# Patient Record
Sex: MALE | Race: WHITE | NOT HISPANIC OR LATINO | Employment: FULL TIME | URBAN - METROPOLITAN AREA
[De-identification: names, ages, dates, MRNs, and addresses within clinical notes are randomized per-mention and may not be internally consistent; named-entity substitution may affect disease eponyms.]

---

## 2024-11-28 ENCOUNTER — APPOINTMENT (OUTPATIENT)
Dept: CARDIOLOGY | Facility: HOSPITAL | Age: 59
End: 2024-11-28
Payer: COMMERCIAL

## 2024-11-28 ENCOUNTER — HOSPITAL ENCOUNTER (EMERGENCY)
Facility: HOSPITAL | Age: 59
Discharge: HOME | End: 2024-11-28
Attending: EMERGENCY MEDICINE
Payer: COMMERCIAL

## 2024-11-28 ENCOUNTER — APPOINTMENT (OUTPATIENT)
Dept: RADIOLOGY | Facility: HOSPITAL | Age: 59
End: 2024-11-28
Payer: COMMERCIAL

## 2024-11-28 VITALS
HEART RATE: 58 BPM | RESPIRATION RATE: 16 BRPM | DIASTOLIC BLOOD PRESSURE: 78 MMHG | HEIGHT: 73 IN | TEMPERATURE: 98.1 F | WEIGHT: 245 LBS | SYSTOLIC BLOOD PRESSURE: 153 MMHG | OXYGEN SATURATION: 98 % | BODY MASS INDEX: 32.47 KG/M2

## 2024-11-28 DIAGNOSIS — K52.9 COLITIS: Primary | ICD-10-CM

## 2024-11-28 LAB
ALBUMIN SERPL BCP-MCNC: 4.3 G/DL (ref 3.4–5)
ALP SERPL-CCNC: 44 U/L (ref 33–120)
ALT SERPL W P-5'-P-CCNC: 18 U/L (ref 10–52)
ANION GAP SERPL CALC-SCNC: 17 MMOL/L (ref 10–20)
APPEARANCE UR: CLEAR
AST SERPL W P-5'-P-CCNC: 18 U/L (ref 9–39)
BASOPHILS # BLD AUTO: 0.03 X10*3/UL (ref 0–0.1)
BASOPHILS NFR BLD AUTO: 0.4 %
BILIRUB SERPL-MCNC: 0.4 MG/DL (ref 0–1.2)
BILIRUB UR STRIP.AUTO-MCNC: NEGATIVE MG/DL
BUN SERPL-MCNC: 15 MG/DL (ref 6–23)
CALCIUM SERPL-MCNC: 9 MG/DL (ref 8.6–10.3)
CARDIAC TROPONIN I PNL SERPL HS: 3 NG/L (ref 0–20)
CARDIAC TROPONIN I PNL SERPL HS: 3 NG/L (ref 0–20)
CHLORIDE SERPL-SCNC: 101 MMOL/L (ref 98–107)
CO2 SERPL-SCNC: 22 MMOL/L (ref 21–32)
COLOR UR: NORMAL
CREAT SERPL-MCNC: 1.15 MG/DL (ref 0.5–1.3)
EGFRCR SERPLBLD CKD-EPI 2021: 74 ML/MIN/1.73M*2
EOSINOPHIL # BLD AUTO: 0.12 X10*3/UL (ref 0–0.7)
EOSINOPHIL NFR BLD AUTO: 1.7 %
ERYTHROCYTE [DISTWIDTH] IN BLOOD BY AUTOMATED COUNT: 12.6 % (ref 11.5–14.5)
GLUCOSE SERPL-MCNC: 122 MG/DL (ref 74–99)
GLUCOSE UR STRIP.AUTO-MCNC: NORMAL MG/DL
HCT VFR BLD AUTO: 42.2 % (ref 41–52)
HGB BLD-MCNC: 13.5 G/DL (ref 13.5–17.5)
HOLD SPECIMEN: NORMAL
HOLD SPECIMEN: NORMAL
IMM GRANULOCYTES # BLD AUTO: 0.02 X10*3/UL (ref 0–0.7)
IMM GRANULOCYTES NFR BLD AUTO: 0.3 % (ref 0–0.9)
KETONES UR STRIP.AUTO-MCNC: NEGATIVE MG/DL
LACTATE SERPL-SCNC: 1.3 MMOL/L (ref 0.4–2)
LACTATE SERPL-SCNC: 2.6 MMOL/L (ref 0.4–2)
LEUKOCYTE ESTERASE UR QL STRIP.AUTO: NEGATIVE
LIPASE SERPL-CCNC: 29 U/L (ref 9–82)
LYMPHOCYTES # BLD AUTO: 2.47 X10*3/UL (ref 1.2–4.8)
LYMPHOCYTES NFR BLD AUTO: 34 %
MCH RBC QN AUTO: 28 PG (ref 26–34)
MCHC RBC AUTO-ENTMCNC: 32 G/DL (ref 32–36)
MCV RBC AUTO: 87 FL (ref 80–100)
MONOCYTES # BLD AUTO: 0.58 X10*3/UL (ref 0.1–1)
MONOCYTES NFR BLD AUTO: 8 %
NEUTROPHILS # BLD AUTO: 4.04 X10*3/UL (ref 1.2–7.7)
NEUTROPHILS NFR BLD AUTO: 55.6 %
NITRITE UR QL STRIP.AUTO: NEGATIVE
NRBC BLD-RTO: 0 /100 WBCS (ref 0–0)
PH UR STRIP.AUTO: 5.5 [PH]
PLATELET # BLD AUTO: 235 X10*3/UL (ref 150–450)
POTASSIUM SERPL-SCNC: 3.5 MMOL/L (ref 3.5–5.3)
PROT SERPL-MCNC: 7.4 G/DL (ref 6.4–8.2)
PROT UR STRIP.AUTO-MCNC: NEGATIVE MG/DL
RBC # BLD AUTO: 4.83 X10*6/UL (ref 4.5–5.9)
RBC # UR STRIP.AUTO: NEGATIVE /UL
SODIUM SERPL-SCNC: 136 MMOL/L (ref 136–145)
SP GR UR STRIP.AUTO: 1.02
UROBILINOGEN UR STRIP.AUTO-MCNC: NORMAL MG/DL
WBC # BLD AUTO: 7.3 X10*3/UL (ref 4.4–11.3)

## 2024-11-28 PROCEDURE — 99285 EMERGENCY DEPT VISIT HI MDM: CPT | Performed by: EMERGENCY MEDICINE

## 2024-11-28 PROCEDURE — 83605 ASSAY OF LACTIC ACID: CPT | Performed by: EMERGENCY MEDICINE

## 2024-11-28 PROCEDURE — 93005 ELECTROCARDIOGRAM TRACING: CPT

## 2024-11-28 PROCEDURE — 84484 ASSAY OF TROPONIN QUANT: CPT | Performed by: EMERGENCY MEDICINE

## 2024-11-28 PROCEDURE — 36415 COLL VENOUS BLD VENIPUNCTURE: CPT | Performed by: EMERGENCY MEDICINE

## 2024-11-28 PROCEDURE — 81003 URINALYSIS AUTO W/O SCOPE: CPT | Performed by: EMERGENCY MEDICINE

## 2024-11-28 PROCEDURE — 85025 COMPLETE CBC W/AUTO DIFF WBC: CPT | Performed by: EMERGENCY MEDICINE

## 2024-11-28 PROCEDURE — 93010 ELECTROCARDIOGRAM REPORT: CPT | Performed by: EMERGENCY MEDICINE

## 2024-11-28 PROCEDURE — 2550000001 HC RX 255 CONTRASTS

## 2024-11-28 PROCEDURE — 74177 CT ABD & PELVIS W/CONTRAST: CPT | Performed by: STUDENT IN AN ORGANIZED HEALTH CARE EDUCATION/TRAINING PROGRAM

## 2024-11-28 PROCEDURE — 83690 ASSAY OF LIPASE: CPT | Performed by: EMERGENCY MEDICINE

## 2024-11-28 PROCEDURE — 99285 EMERGENCY DEPT VISIT HI MDM: CPT | Mod: 25

## 2024-11-28 PROCEDURE — 84484 ASSAY OF TROPONIN QUANT: CPT

## 2024-11-28 PROCEDURE — 74177 CT ABD & PELVIS W/CONTRAST: CPT

## 2024-11-28 PROCEDURE — 80053 COMPREHEN METABOLIC PANEL: CPT | Performed by: EMERGENCY MEDICINE

## 2024-11-28 PROCEDURE — 96360 HYDRATION IV INFUSION INIT: CPT

## 2024-11-28 PROCEDURE — 2500000004 HC RX 250 GENERAL PHARMACY W/ HCPCS (ALT 636 FOR OP/ED)

## 2024-11-28 RX ORDER — CIPROFLOXACIN 500 MG/1
500 TABLET ORAL EVERY 12 HOURS
Qty: 10 TABLET | Refills: 0 | Status: SHIPPED | OUTPATIENT
Start: 2024-11-28 | End: 2024-12-03

## 2024-11-28 RX ORDER — METRONIDAZOLE 500 MG/1
500 TABLET ORAL 3 TIMES DAILY
Qty: 30 TABLET | Refills: 0 | Status: SHIPPED | OUTPATIENT
Start: 2024-11-28 | End: 2024-12-05

## 2024-11-28 RX ORDER — ONDANSETRON 4 MG/1
4 TABLET, ORALLY DISINTEGRATING ORAL EVERY 8 HOURS PRN
Qty: 20 TABLET | Refills: 0 | Status: SHIPPED | OUTPATIENT
Start: 2024-11-28 | End: 2024-12-05

## 2024-11-28 ASSESSMENT — COLUMBIA-SUICIDE SEVERITY RATING SCALE - C-SSRS
2. HAVE YOU ACTUALLY HAD ANY THOUGHTS OF KILLING YOURSELF?: NO
1. IN THE PAST MONTH, HAVE YOU WISHED YOU WERE DEAD OR WISHED YOU COULD GO TO SLEEP AND NOT WAKE UP?: NO
6. HAVE YOU EVER DONE ANYTHING, STARTED TO DO ANYTHING, OR PREPARED TO DO ANYTHING TO END YOUR LIFE?: NO

## 2024-11-28 ASSESSMENT — PAIN - FUNCTIONAL ASSESSMENT: PAIN_FUNCTIONAL_ASSESSMENT: 0-10

## 2024-11-28 ASSESSMENT — PAIN SCALES - GENERAL
PAINLEVEL_OUTOF10: 0 - NO PAIN
PAINLEVEL_OUTOF10: 4

## 2024-11-28 ASSESSMENT — PAIN DESCRIPTION - LOCATION: LOCATION: ABDOMEN

## 2024-11-28 ASSESSMENT — PAIN DESCRIPTION - ORIENTATION: ORIENTATION: MID;LOWER

## 2024-11-29 LAB — HOLD SPECIMEN: NORMAL

## 2024-11-29 NOTE — DISCHARGE INSTRUCTIONS
You were seen in the Emergency Department (ED) for abdominal pain. Your work-up and exam was unimpressive for life or limb-threatening cause at this time.    Please utilize anti-inflammatories acetaminophen (Tylenol) and ibuprofen (Advil) for your pain as recommended. You can take both acetaminophen and ibuprofen together.  Utilize a clear liquid diet for at least 1 week.    Please seek immediate medical attention should you feel worse in any way or have any of the following symptoms: increasing or different abdominal pain, abdominal distension (bloating), persistent vomiting, blood in stool, fevers of 38C (100.4) or higher, shaking chills, lightheadedness, confusion, yellowish skin, or itchiness. Please return to the emergency department for a recheck in 8-12 hours if the pain is persistent or worse so we can re-evaluate you and ensure that you are not developing a problem that would require surgery or hospitalization.   Statement Selected

## 2024-11-29 NOTE — ED PROVIDER NOTES
Emergency Department Provider Note        History of Present Illness     History provided by: Patient  Limitations to History: None  External Records Reviewed with Brief Summary: None    HPI:  Miky Kaiser is a 59 y.o. male presents with a chief complaint of severe abdominal pain, heavy sweating, and diarrhea that began suddenly during a meal. The pain was located in the lower abdomen and was accompanied by an urgent need to use the restroom. The patient reports no blood in the stool, which was described as mushy. The symptoms started right after eating, and the patient has no prior history of similar pain.    The patient has a PMH of a hernia on the left side, bowel issues as a child (possibly intussusception), and a recent colonoscopy in January revealing one polyp and an ulcer. The patient was taking naproxen for lower back pain but switched to Tylenol on the doctor's advice.    The patient denies being diabetic, although it runs in the family. The patient's current medications include vitamins D, C, zinc, apple cider vinegar, and fish oil for cholesterol, but no prescribed medications. The patient does not smoke cigarettes, drinks alcohol occasionally, and uses marijuana gummies infrequently. The patient denies using other drugs such as cocaine or heroin.    Physical Exam   Triage vitals:  T 36.7 °C (98.1 °F)  HR 64  /62  RR 18  O2 100 % None (Room air)    General: Awake, alert, in no acute distress  Eyes: Gaze conjugate.  No scleral icterus or injection  HENT: Normo-cephalic, atraumatic. No stridor  CV: Regular rate, regular rhythm. Radial pulses 2+ bilaterally  Resp: Breathing non-labored, speaking in full sentences.  Clear to auscultation bilaterally  GI: Soft, non-distended. No rebound or guarding. Left inguinal hernia reducible and non-tender. Lower abdominal tenderness.  MSK/Extremities: No gross bony deformities. Moving all extremities  Skin: Warm. Appropriate color  Neuro: Alert. Oriented. Face  symmetric. Speech is fluent.  Gross strength and sensation intact in b/l UE and LEs  Psych: Appropriate mood and affect    Medical Decision Making & ED Course   Medical Decision Makin y.o. male with a history of gastrointestinal issues, including possible intussusception and difficulty with bowel movements since childhood, presenting with acute abdominal pain, heavy sweating, and diarrhea after eating. The patient has a history of hernia on the left side and a recent colonoscopy showing one polyp and an ulcer. The patient also reports lightheadedness and has a family history of diabetes.    Based on the patient's history, symptoms, and presentation, the condition is most concerning for an acute gastrointestinal issue, possibly related to the patient's history of bowel problems. However, complications related to the hernia or other abdominal abnormalities cannot be ruled out without further investigation.    Plan includes obtaining a CT scan of the abdomen and pelvis, ordering labs including CBC, CMP, UA, lipase, and troponin, and monitoring pain levels. The patient's vital signs will be monitored due to reported lightheadedness.   ----  Differential diagnoses considered include but are not limited to: Appendicitis, Cholecystitis, Pancreatitis, PUD, Gastroenteritis, Intestinal obstruction, Diverticulitis, IBD, ACS, UTI/Pyelo, Calculi, Hepatitis, Splenic infarct, Hernia, Constipation, Adhesions, Malignancy, Bowel perforation, Volvulus, Peritonitis, Trauma, Abscess     Social Determinants of Health which Significantly Impact Care: None identified     EKG Independent Interpretation: EKG at 2119. Ventricular Rate: 52. Rhythm: Sinus. LAD. No acute ischemic changes.  ms. QTc: not prolonged at 403 ms.     Independent Result Review and Interpretation: Labs unimpressive for systemic inflammation or infection, acute anemia or blood loss, JAMES, hepatitis, pancreatitis, lactic acidosis, acute coronary syndrome, or  electrolyte abnormalities.  CT abdomen pelvis reveals diarrhea throughout the transverse colon related thickening of the colonic wall and rectum concerning for colitis.  No terminal endpoint dilation to suggest obstruction, free air, or free fluid in the abdomen.    Chronic conditions affecting the patient's care: As documented above in [facility]    The patient was discussed with the following consultants/services: None    Care Considerations: As documented above in [facility]    ED Course:  Diagnoses as of 11/29/24 1007   Colitis     Disposition   As a result of the work-up, the patient was discharged home.  he was informed of his diagnosis and instructed to come back with any concerns or worsening of condition. Ciprofloxacin and Flagyl prescribed for colitis with zofran as needed for nausea. Instructed to utilize cler liquid diet for 1 week.  he and was agreeable to the plan as discussed above.  he was given the opportunity to ask questions.  All of the patient's questions were answered.    Procedures   Procedures    This was a shared visit with an ED attending.  The patient was seen and discussed with the ED attending    Charlie Cheung MD  Emergency Medicine, PGY3     Charlie Cheung MD  Resident  11/29/24 1007

## 2024-11-30 LAB
ATRIAL RATE: 56 BPM
ATRIAL RATE: 64 BPM
P AXIS: 48 DEGREES
P AXIS: 64 DEGREES
P OFFSET: 172 MS
P OFFSET: 180 MS
P ONSET: 114 MS
P ONSET: 120 MS
PR INTERVAL: 176 MS
PR INTERVAL: 184 MS
Q ONSET: 206 MS
Q ONSET: 208 MS
QRS COUNT: 10 BEATS
QRS COUNT: 9 BEATS
QRS DURATION: 104 MS
QRS DURATION: 106 MS
QT INTERVAL: 412 MS
QT INTERVAL: 418 MS
QTC CALCULATION(BAZETT): 403 MS
QTC CALCULATION(BAZETT): 425 MS
QTC FREDERICIA: 408 MS
QTC FREDERICIA: 420 MS
R AXIS: -6 DEGREES
R AXIS: 6 DEGREES
T AXIS: 24 DEGREES
T AXIS: 4 DEGREES
T OFFSET: 412 MS
T OFFSET: 417 MS
VENTRICULAR RATE: 56 BPM
VENTRICULAR RATE: 64 BPM